# Patient Record
Sex: MALE | Race: WHITE | NOT HISPANIC OR LATINO | Employment: PART TIME | URBAN - METROPOLITAN AREA
[De-identification: names, ages, dates, MRNs, and addresses within clinical notes are randomized per-mention and may not be internally consistent; named-entity substitution may affect disease eponyms.]

---

## 2021-09-16 ENCOUNTER — HOSPITAL ENCOUNTER (EMERGENCY)
Facility: HOSPITAL | Age: 21
Discharge: HOME | End: 2021-09-16
Attending: EMERGENCY MEDICINE | Admitting: EMERGENCY MEDICINE

## 2021-09-16 ENCOUNTER — APPOINTMENT (OUTPATIENT)
Dept: GENERAL RADIOLOGY | Facility: HOSPITAL | Age: 21
End: 2021-09-16

## 2021-09-16 VITALS
RESPIRATION RATE: 18 BRPM | BODY MASS INDEX: 23.7 KG/M2 | HEART RATE: 66 BPM | SYSTOLIC BLOOD PRESSURE: 140 MMHG | DIASTOLIC BLOOD PRESSURE: 85 MMHG | OXYGEN SATURATION: 97 % | WEIGHT: 175 LBS | TEMPERATURE: 97.9 F | HEIGHT: 72 IN

## 2021-09-16 DIAGNOSIS — R55 VASOVAGAL SYNCOPE: ICD-10-CM

## 2021-09-16 DIAGNOSIS — S90.211A CONTUSION OF RIGHT GREAT TOE WITH DAMAGE TO NAIL, INITIAL ENCOUNTER: Primary | ICD-10-CM

## 2021-09-16 LAB
HOLD SPECIMEN: NORMAL
HOLD SPECIMEN: NORMAL
WHOLE BLOOD HOLD SPECIMEN: NORMAL
WHOLE BLOOD HOLD SPECIMEN: NORMAL

## 2021-09-16 PROCEDURE — 99283 EMERGENCY DEPT VISIT LOW MDM: CPT

## 2021-09-16 PROCEDURE — 73630 X-RAY EXAM OF FOOT: CPT

## 2021-09-16 PROCEDURE — 93005 ELECTROCARDIOGRAM TRACING: CPT | Performed by: EMERGENCY MEDICINE

## 2021-09-16 RX ORDER — SODIUM CHLORIDE 0.9 % (FLUSH) 0.9 %
10 SYRINGE (ML) INJECTION AS NEEDED
Status: DISCONTINUED | OUTPATIENT
Start: 2021-09-16 | End: 2021-09-17 | Stop reason: HOSPADM

## 2021-09-17 LAB — HOLD SPECIMEN: NORMAL

## 2021-09-17 NOTE — ED PROVIDER NOTES
Subjective   21-year-old male who presents for evaluation of right great toe pain and syncope.  The patient reports that he dropped a full jar of pickles on his right great toe.  He reports significant pain in association with this.  While he was talking with his friends he noticed that he slowly could not hear what they said, and he ultimately lost consciousness.  The patient states that he has a known history of syncope whenever he cut his finger before the past.  His friends report that he had some mild shaking when he lost consciousness.  He has no previous history of seizures.  He has awoke to his normal baseline mentation without any intervention.  He answers all questions normally at this given time.  No reported chest pain cough or shortness of breath.  No reported fever, body aches, or chills.  No report abdominal pain or change in bowel or urinary function.  His oral fluid intake has been good.  No new or different medications.          Review of Systems   Constitutional: Negative for chills, fatigue and fever.   HENT: Negative for congestion, ear pain, postnasal drip, sinus pressure and sore throat.    Eyes: Negative for pain, redness and visual disturbance.   Respiratory: Negative for cough, chest tightness and shortness of breath.    Cardiovascular: Negative for chest pain, palpitations and leg swelling.   Gastrointestinal: Negative for abdominal pain, anal bleeding, blood in stool, diarrhea, nausea and vomiting.   Endocrine: Negative for polydipsia and polyuria.   Genitourinary: Negative for difficulty urinating, dysuria, frequency and urgency.   Musculoskeletal: Positive for arthralgias. Negative for back pain and neck pain.   Skin: Negative for pallor and rash.   Allergic/Immunologic: Negative for environmental allergies and immunocompromised state.   Neurological: Positive for syncope. Negative for dizziness, weakness and headaches.   Hematological: Negative for adenopathy.   Psychiatric/Behavioral:  Negative for confusion, self-injury and suicidal ideas. The patient is not nervous/anxious.    All other systems reviewed and are negative.      History reviewed. No pertinent past medical history.    No Known Allergies    Past Surgical History:   Procedure Laterality Date   • EAR TUBES         History reviewed. No pertinent family history.    Social History     Socioeconomic History   • Marital status: Single     Spouse name: Not on file   • Number of children: Not on file   • Years of education: Not on file   • Highest education level: Not on file   Tobacco Use   • Smoking status: Never Smoker   • Smokeless tobacco: Never Used   Vaping Use   • Vaping Use: Never used   Substance and Sexual Activity   • Alcohol use: Yes     Comment: occasional   • Drug use: Not Currently     Types: Marijuana     Comment: quit month use   • Sexual activity: Defer           Objective   Physical Exam  Vitals and nursing note reviewed.   Constitutional:       General: He is not in acute distress.     Appearance: Normal appearance. He is well-developed. He is not toxic-appearing or diaphoretic.   HENT:      Head: Normocephalic and atraumatic.      Right Ear: External ear normal.      Left Ear: External ear normal.      Nose: Nose normal.   Eyes:      General: Lids are normal.      Pupils: Pupils are equal, round, and reactive to light.   Neck:      Trachea: No tracheal deviation.   Cardiovascular:      Rate and Rhythm: Normal rate and regular rhythm.      Pulses: No decreased pulses.      Heart sounds: Normal heart sounds. No murmur heard.   No friction rub. No gallop.    Pulmonary:      Effort: Pulmonary effort is normal. No respiratory distress.      Breath sounds: Normal breath sounds. No decreased breath sounds, wheezing, rhonchi or rales.   Abdominal:      General: Bowel sounds are normal.      Palpations: Abdomen is soft.      Tenderness: There is no abdominal tenderness. There is no guarding or rebound.   Musculoskeletal:          General: No deformity. Normal range of motion.      Cervical back: Normal range of motion and neck supple.      Right foot: Tenderness and bony tenderness present. No laceration.        Legs:    Lymphadenopathy:      Cervical: No cervical adenopathy.   Skin:     General: Skin is warm and dry.      Findings: No rash.   Neurological:      Mental Status: He is alert and oriented to person, place, and time.      Cranial Nerves: No cranial nerve deficit.      Sensory: No sensory deficit.   Psychiatric:         Speech: Speech normal.         Behavior: Behavior normal.         Thought Content: Thought content normal.         Judgment: Judgment normal.         Procedures           ED Course                                           MDM  Number of Diagnoses or Management Options  Contusion of right great toe with damage to nail, initial encounter: new and requires workup  Vasovagal syncope: new and requires workup  Diagnosis management comments: No acute injuries identified on x-ray of the right foot.  Patient has a story consistent with vasovagal syncope.    Vital signs are normal here in the ER.    Discharged home appearing well.       Amount and/or Complexity of Data Reviewed  Tests in the radiology section of CPT®: ordered and reviewed  Review and summarize past medical records: yes  Independent visualization of images, tracings, or specimens: yes        Final diagnoses:   Contusion of right great toe with damage to nail, initial encounter   Vasovagal syncope       ED Disposition  ED Disposition     ED Disposition Condition Comment    Discharge Stable           No follow-up provider specified.       Medication List      No changes were made to your prescriptions during this visit.          Xochilt Nunes MD  09/16/21 [id number]

## 2021-09-18 LAB
QT INTERVAL: 418 MS
QTC INTERVAL: 424 MS

## 2021-11-28 ENCOUNTER — E-VISIT (OUTPATIENT)
Dept: FAMILY MEDICINE CLINIC | Facility: TELEHEALTH | Age: 21
End: 2021-11-28

## 2021-11-28 PROCEDURE — BRIGHTMDVISIT: Performed by: NURSE PRACTITIONER

## 2021-11-29 NOTE — E-VISIT TREATED
Chief Complaint: Anxiety, Depression, Stress   Patient introduction   Patient is 21-year-old male presenting with mood symptoms. Patient is not sure how long they've been having symptoms. Patient is willing to try medication as part of their treatment plan.   Patient-submitted comments explaining reason for visit: I've been feeling depressed for a while now. I've seemed to lose interest in things that I used to really enjoy doing and when I do want to do them, I'm either too tired or lack motivation to start..   Patient did not request an excuse note.   Denies recent unusual stress from their home situation, family, personal relationships, work, finances, COVID-19, or current news and events.   Depression screening   PHQ-9. Response options are: Not at all (0), On several days (1), More than half the days (2), or Nearly every day (3).   Over the past 2 weeks, patient has been bothered:    (3) Nearly every day by having little interest or pleasure in doing things    (3) Nearly every day by depressed mood    (3) Nearly every day by sleep disturbance    (3) Nearly every day by fatigue or lethargy    (2) On more than half the days by change in appetite    (1) On several days by feelings of worthlessness or excessive guilt    (3) Nearly every day by poor concentration    (1) On several days by observable restlessness or slowness in movement    (1) On several days by thoughts of hurting themselves or that they'd be better off dead   Reports that the above problems have made it very difficult to work, function at home, or get along with other people.   Score: 20. Interpretation: 0-4: None to minimal. 5-9: Mild depression. 10-14: Major Depressive Disorder, Mild. 15-19: Major Depressive Disorder, Moderately Severe. 20-27: Major Depressive Disorder, Severe.   Anxiety screening   JELLY-7. Response options are: Not at all (0), On several days (1), More than half the days (2), or Nearly every day (3)   Over the past 2 weeks,  patient has been bothered:    (2) On more than half the days by feeling nervous, anxious, or on edge    (3) Nearly every day by not being able to stop or control worrying    (2) On more than half the days by worrying too much about different things    (3) Nearly every day by having trouble relaxing    (1) On several days by being so restless that it's hard to sit still    (2) On more than half the days by becoming easily annoyed or irritable    (1) On several days by feeling afraid, as if something awful might happen   Reports that the above problems have made it very difficult to work, function at home, or get along with other people.   Score: 14. Interpretation: 0-4: None to minimal. 5-9: Mild anxiety. 10-14: Moderate anxiety. 15-21: Severe anxiety.   Suicide risk screening   Score: Negative screen (based on PHQ-9 responses above).   Action taken based on risk:    Negative screen: Patient completed interview.    Low risk: Patient completed interview. Follow-up per provider discretion.    Moderate risk: Recommended to call the National Suicide Prevention Lifeline, 911, or go to their nearest ER. Patient given option to continue with the interview if those options are not relevant at this time. Follow-up per provider discretion.    High risk: Recommended to go to ER, call 911, or call the [employer] Suicide Prevention Lifeline. Patient given option to continue with the interview if those options are not relevant at this time.   Repetitive thoughts and behaviors screening   DSM-5 Level 1 Cross-Cutting Symptom Measure, Section X. 2 items. Response options are: Not at all (0), Rarely (1), Several days (2), More than half the days (3), or Nearly every day (4)   Over the past 2 weeks, patient has been bothered:    (2) On several days by unpleasant thoughts, urges, or images that repeatedly enter their mind    (1) On 1-2 days by feeling driven to repeat certain behaviors or mental acts   Score: 3. Interpretation: 0-2 (with  0-1 on both items): Negative screen. >= 2 (with >= 2 on either item): Positive screen.   Rosio/hypomania screening   DSM-5 Level 1 Cross-Cutting Symptom Measure, Section III. 2 items. Response options are: Not at all (0), Rarely (1), Several days (2), More than half the days (3), or Nearly every day (4)   Over the past 2 weeks, patient has been bothered:    (2) On several days by sleeping less than usual, but still having a lot of energy    (0) Not at all by starting lots more projects than usual or doing more risky things than usual   Score: 2. Interpretation: 0-2 (with <= 1 on both items): Negative screen. >= 2 (with >= 2 on at least 1 item): Positive screen; in-interview follow-up with [place] Self-Rating Rosio (ASRM) Scale.   [place] Self-Rating Rosio (ASRM) Scale. 5 items in which patient chooses the statement that best describes how they've been feeling over the past week.   Patient responses:    (0) I don't feel happier or more cheerful than usual    (0) I don't feel more self-confident than usual    (3) I need less sleep than usual most of the time    (0) I don't talk more than usual    (0) I have not been more active than usual   Score: 3. Interpretation: A score of >= 6 indicates a high probability of a manic or hypomanic condition and may indicate a need for treatment and/or further diagnostic workup. A score of 5 or lower is less likely to be associated with significant symptoms of rosio.   Psychosis/hallucination screening   DSM-5 Level 1 Cross-Cutting Symptom Measure, Section VII. 2 items. Response options are: Not at all (0), Rarely (1), Several days (2), More than half the days (3), or Nearly every day (4)   Over the past 2 weeks, patient has been bothered:    (0) Not at all by hearing things other people couldn't hear    (0) Not at all by feeling that someone could hear their thoughts   Score: 0. Interpretation: 0: Negative screen. 1 or higher: Positive screen.   Substance abuse screening   DSM-5  Level 1 Cross-Cutting Symptom Measure, Section XIII. 3 items on use of alcohol, tobacco, recreational drugs, or prescription medications beyond the amount prescribed or duration of prescription.   Over the past 2 weeks, patient:    (0) Denies having at least 4 alcoholic drinks on any single day    (0) Denies using tobacco    (4) Reports using a recreational or prescription drug on their own nearly every day   Score: 4. Interpretation: 0 is a negative screen. 1 or higher with positive response for prescription/recreational drug abuse leads to follow-up with Level 2 Cross-Cutting Symptom Measure, Section XIII. 1 or higher with positive response for alcohol leads to follow-up with AUDIT-C. 1 or higher with positive response for tobacco use leads to tobacco cessation advice in AVS.   DSM-5 Level 2 Cross-Cutting Symptom Measure, Section XIII. 1 item per positive response to substance use on Level 1 screening above.   Over the past 2 weeks, patient:    (4) Used marijuana nearly every day   Score: 4. Interpretation: Scores on the individual items should be interpreted independently. Positive responses on multiple items indicates greater severity and complexity of substance use.   Comorbid/Exacerbating conditions   Denies history of asthma, cancer, chronic pain, congestive heart failure, coronary artery disease, diabetes, epilepsy, hypertension, inflammatory arthritis, kidney disease or history of kindey function problems, lupus, multiple sclerosis, Parkinson disease, thyroid disorder, and viral hepatitis.   Past mental health history   Denies history of depression, generalized anxiety disorder, panic attacks, PTSD, OCD, bipolar disorder, or schizophrenia or schizoaffective disorder.   Family history of mental health disorders   Reports family history of depression, generalized anxiety disorder, and panic attacks.   Current mental health treatment   Patient is not currently taking medication for any mental health condition.  Patient is not currently in counseling or therapy. Patient is not currently being seen by a psychiatrist and has not been seen by one in the last 2 years.   Previous mental health treatment   Patient denies history of diagnosis with any mental health condition.   Patient has not taken medication for any mental health condition in the past.   Current medications   Denies taking other medications or supplements.   Medication allergies   None.   Medication contraindications   None.   Assessment   Major depressive disorder, single, severe.   This diagnosis is based on review of patient interview responses and other available clinical information.    PHQ-9 depression screening score: 20. Interpretation: 20-27: Major Depressive Disorder, Severe.    JELLY-7 generalized anxiety screening score: 14. Interpretation: 10-14: Moderate anxiety.   Suicide risk severity screening was negative.   Based on screening tests, the following areas warrant further evaluation at follow-up:    Substance use    Repetitive thoughts and behaviors   Plan   Medications:   No medications prescribed.   Orders:    Referral to behavioral health.   Education:    Condition and causes    Treatment and self-care    Possible medication side effects    When to call provider      ----------   Electronically signed by MORALES Levy on 2021-11-28 at 22:00PM   ----------   Patient Interview Transcript:   Have you recently experienced unusual stress from any of these? Select all that apply.    None of the above   Not selected:    Personal relationships    Home situation    Family    Work    Finances    Something related to COVID-19    Current news and events   Have you ever been diagnosed with any of these mental health conditions? Select all that apply.    None of the above   Not selected:    Depression    Generalized anxiety disorder (JELLY)    Panic attacks    Post traumatic stress disorder (PTSD)    Obsessive-compulsive disorder (OCD)    Bipolar  disorder    Schizophrenia or schizoaffective disorder    A mental health condition not listed here (specify)   Are you currently taking medication for any mental health condition? Select one.    No   Not selected:    Yes   Have you taken medication for any mental health condition in the past? Select one.    No   Not selected:    Yes   Are you currently in counseling or therapy? Select one.    No   Not selected:    Yes   Are you currently being seen by a psychiatrist, or have you been seen by a psychiatrist in the last 2 years? Select one.    No   Not selected:    Yes, currently    Yes, within the last 2 years   Do you have any of these medical conditions? Scroll to see all options. Select all that apply.    None of the above   Not selected:    Asthma    Cancer    Chronic pain    Congestive heart failure    Coronary artery disease (blocked arteries in the heart)    Diabetes    Epilepsy    High blood pressure    Inflammatory arthritis    Kidney disease or history of kidney function problems    Lupus (SLE)    Multiple sclerosis    Parkinson disease    Thyroid disorder    Viral hepatitis   Has anyone in your family had any of these? Select all that apply.    Depression    Generalized anxiety disorder (JELLY)    Panic attacks   Not selected:    Post traumatic stress disorder (PTSD)    Obsessive-compulsive disorder (OCD)    Bipolar disorder    Schizophrenia or schizoaffective disorder    Drug or alcohol addiction (substance use disorder)     by suicide    Attempted suicide    No, not that I know of   1. Over the past 2 weeks, how often have you been bothered by: Having little interest or pleasure in doing things Select one.    Nearly every day   Not selected:    Not at all    Several days    More than half the days   2. Over the past 2 weeks, how often have you been bothered by: Feeling down, depressed, or hopeless Select one.    Nearly every day   Not selected:    Not at all    Several days    More than half the days    3. Over the past 2 weeks, how often have you been bothered by: Trouble falling or staying asleep, or sleeping too much Select one.    Nearly every day   Not selected:    Not at all    Several days    More than half the days   4. Over the past 2 weeks, how often have you been bothered by: Feeling tired or having little energy Select one.    Nearly every day   Not selected:    Not at all    Several days    More than half the days   5. Over the past 2 weeks, how often have you been bothered by: Poor appetite or overeating Select one.    More than half the days   Not selected:    Not at all    Several days    Nearly every day   6. Over the past 2 weeks, how often have you been bothered by: Feeling bad about yourself, that you're a failure, or that you've let yourself or friends and family down Select one.    Several days   Not selected:    Not at all    More than half the days    Nearly every day   7. Over the past 2 weeks, how often have you been bothered by: Trouble concentrating on things like watching TV or reading the news Select one.    Nearly every day   Not selected:    Not at all    Several days    More than half the days   8. Over the past 2 weeks, how often have you been bothered by: Moving or speaking so slowly that other people could have noticed OR Being so fidgety or restless that you have been moving around a lot more than usual Select one.    Several days   Not selected:    Not at all    More than half the days    Nearly every day   9. Over the past 2 weeks, how often have you been bothered by: Thoughts that you'd be better off dead or thoughts of hurting yourself Select one.    Several days   Not selected:    Not at all    More than half the days    Nearly every day   How difficult have these problems made it for you to work, take care of things at home, or get along with other people? Select one.    Very difficult   Not selected:    Not difficult at all    Somewhat difficult    Extremely difficult    1. Over the past 2 weeks, how often have you been bothered by: Feeling nervous, anxious, or on edge? Select one.    More than half the days   Not selected:    Not at all    Several days    Nearly every day   2. Over the past 2 weeks, how often have you been bothered by: Not being able to stop or control worrying? Select one.    Nearly every day   Not selected:    Not at all    Several days    More than half the days   3. Over the past 2 weeks, how often have you been bothered by: Worrying too much about different things? Select one.    More than half the days   Not selected:    Not at all    Several days    Nearly every day   4. Over the past 2 weeks, how often have you been bothered by: Having trouble relaxing? Select one.    Nearly every day   Not selected:    Not at all    Several days    More than half the days   5. Over the past 2 weeks, how often have you been bothered by: Being so restless that it's hard to sit still? Select one.    Several days   Not selected:    Not at all    More than half the days    Nearly every day   6. Over the past 2 weeks, how often have you been bothered by: Becoming easily annoyed or irritable? Select one.    More than half the days   Not selected:    Not at all    Several days    Nearly every day   7. Over the past 2 weeks, how often have you been bothered by: Feeling afraid, as if something awful might happen? Select one.    Several days   Not selected:    Not at all    More than half the days    Nearly every day   How difficult have these symptoms made it for you to do your work, take care of things at home, or get along with other people? Select one.    Very difficult   Not selected:    Not difficult at all    Somewhat difficult    Extremely difficult   Over the past 2 weeks, how often have you been bothered by: Sleeping less than usual, but still having a lot of energy? Select one.    Several days   Not selected:    Not at all    1 to 2 days    More than half the days    " Nearly every day   Over the past 2 weeks, how often have you been bothered by: Starting lots more projects than usual or doing more risky things than usual? Select one.    Not at all   Not selected:    1 to 2 days    Several days    More than half the days    Nearly every day   Which statement best describes how you've been feeling over the past week? \"Occasionally\" here means once or twice, and \"often\" means several times or more. Select one.    I don't feel happier or more cheerful than usual   Not selected:    I occasionally feel happier or more cheerful than usual    I often feel happier or more cheerful than usual    I feel happier or more cheerful than usual most of the time    I feel happier or more cheerful than usual all of the time   Which statement best describes how you've been feeling over the past week? \"Occasionally\" here means once or twice, and \"often\" means several times or more. Select one.    I don't feel more self-confident than usual   Not selected:    I occasionally feel more self-confident than usual    I often feel more self-confident than usual    I feel more self-confident than usual most of the time    I feel extremely self-confident all of the time   Which statement best describes how you've been feeling over the past week? \"Occasionally\" here means once or twice, and \"often\" means several times or more. Select one.    I need less sleep than usual most of the time   Not selected:    I don't need less sleep than usual    I occasionally need less sleep than usual    I often need less sleep than usual    I can go all day and all night without any sleep and still not feel tired   Which statement best describes how you've been feeling over the past week? \"Occasionally\" here means once or twice, and \"often\" means several times or more. Select one.    I don't talk more than usual   Not selected:    I occasionally talk more than usual    I often talk more than usual    I talk more than usual " "most of the time    I talk constantly and can't be interrupted   Which statement best describes how you've been feeling over the past week? \"Occasionally\" here means once or twice, and \"often\" means several times or more. By \"active,\" we mean socially, sexually, or at work, home, or school. Select one.    I haven't been more active than usual   Not selected:    I have occasionally been more active than usual    I have often been more active than usual    I have been more active than usual most of the time    I am constantly active or on the go all the time   Over the past 2 weeks, how often have you been bothered by: Hearing things other people couldn't hear, such as voices even when no one was around? Select one.    Not at all   Not selected:    1 to 2 days    Several days    More than half the days    Nearly every day   Over the past 2 weeks, how often have you been bothered by: Feeling that someone could hear your thoughts, or that you could hear what another person was thinking? Select one.    Not at all   Not selected:    1 to 2 days    Several days    More than half the days    Nearly every day   Over the past 2 weeks, how often have you been bothered by: Unpleasant thoughts, urges, or images that repeatedly enter your mind? Select one.    Several days   Not selected:    Not at all    1 to 2 days    More than half the days    Nearly every day   Over the past 2 weeks, how often have you been bothered by: Feeling driven to perform certain behaviors or mental acts over and over again? Select one.    1 to 2 days   Not selected:    Not at all    Several days    More than half the days    Nearly every day   Over the past 2 weeks, how often did you: Have at least 4 drinks of any kind of alcohol in a single day? Select one.    Not at all   Not selected:    1 to 2 days    Several days    More than half the days    Nearly every day   Over the past 2 weeks, how often have you: Smoked any cigarettes, smoked a cigar or " "pipe, or used snuff or chewing tobacco? Select one.    Not at all   Not selected:    1 to 2 days    Several days    More than half the days    Nearly every day   Over the past 2 weeks, how often did you use any of these medicines on your own? \"On your own\" means without a doctor's prescription, or more than prescribed, or longer than prescribed. - Prescription painkillers, such as Vicodin - Stimulants, such as Ritalin or Adderall - Sedatives or tranquilizers, such as sleeping pills or Valium - Marijuana - Cocaine or crack - Club drugs, such as Ecstasy - Hallucinogens, such as LSD - Heroin - Inhalants or solvents, such as glue - Methamphetamines, such as speed Select one.    Nearly every day   Not selected:    Not at all    1 to 2 days    Several days    More than half the days   Over the past 2 weeks, which of these medicines did you use on your own? Select all that apply.    Marijuana   Not selected:    Prescription painkillers, such as Vicodin    Stimulants, such as Ritalin or Adderall    Sedatives or tranquilizers, such as sleeping pills or Valium    Cocaine or crack    Club drugs, such as Ecstasy    Hallucinogens, such as LSD    Heroin    Inhalants or solvents, such as glue    Methamphetamines, such as speed   Over the past 2 weeks, how often did you use marijuana? Select one.    Nearly every day   Not selected:    1 to 2 days    Several days    More than half the days   Think about all of the symptoms you've shared with us today. How long have you been feeling this way? Select one.    I'm not sure   Not selected:    More than a year    Less than a year   These last few questions help us make sure your treatment plan is safe for you. Do you have any of these conditions? Select all that apply.    None of these   Not selected:    Uncorrected or persistent electrolyte abnormalities, such as potassium, sodium, calcium or magnesium    QT prolongation    Congenital long QT syndrome (LQTS)    Ventricular arrhythmias, " "such as ventricular fibrillation or ventricular tachycardia    Bradycardia (low heart rate)    Recent heart attack    Congestive heart failure (CHF)    Brugada syndrome   Do any of these apply to you now or in the recent past? \"Cold turkey\" here means stopping a medication suddenly rather than slowly taking lower and lower doses until you're off the medication. Select all that apply.    None of these   Not selected:    Seizure disorder    Bulimia or anorexia    Liver disease    Alcohol abuse    Stopped using alcohol \"cold turkey\"    Stopped using a sedative \"cold turkey\"    Stopped using an anti-seizure drug \"cold turkey\"    Stopped using a benzodiazepine drug (Klonopin, Valium, Ativan, Xanax) \"cold turkey\"   Are you currently taking any of these medications? Select all that apply.    None of these   Not selected:    MAO inhibitor in the last 14 days    Linezolid or IV methylene blue    Pimozide    Thioridazine   Are you taking any other medications or supplements? On the next screen, you need to list all vitamins, supplements, non-prescription medications (such as aspirin or Aleve), and prescription medications that you're taking. Select one.    No   Not selected:    Yes    Yes, but I'm not sure what they are   Have you ever had an allergic or bad reaction to any medication? Select one.    No   Not selected:    Yes   If medication is recommended as part of your treatment plan, is that something you're willing to try? Select one.    Yes   Not selected:    No   Do you need a doctor's note? A doctor's note confirms that you received care today and states when you can return to school or work. It does not contain information about your diagnosis or treatment plan. Your provider will make the final decision on whether to give you a doctor's note. Doctor's notes CANNOT be backdated. Select one.    No   Not selected:    Today only (1 day)    Today and tomorrow (2 days)    3 days   What is the main reason you're taking " this interview today?    I've been feeling depressed for a while now. I've seemed to lose interest in things that I used to really enjoy doing and when I do want to do them, I'm either too tired or lack motivation to start.   ----------   Medical history   Medical history data does not currently exist for this patient.

## 2021-11-29 NOTE — EXTERNAL PATIENT INSTRUCTIONS
Diagnosis   Depression   My name is Meme Porter. I'm a healthcare provider at [facility]. I've reviewed your interview, and I see that you have some common symptoms of depression. I'm glad you reached out.   Depression is the most common mental health condition worldwide. In the [place], about 1 in 5 people will experience clinical depression in their lifetime. Fortunately, effective treatments are available. The sooner you start treatment, the better it works.   Treatment for depression can include counseling, coaching, consultations, antidepressant medications, or various digital tools. In creating your treatment plan, I've considered your symptoms, current situation, medical history, and previous treatments, if any.    Please follow up with your provider in a few weeks. They'll check how you're doing and adjust your treatment plan if necessary.   In addition to your prescribed treatment, there are things you can do to help yourself feel better. Depression can lead you to avoid doing tasks, activities, and being with others. Taking action can help break the cycle of avoidance. Even small actions and lifestyle changes can make a big difference. Try some of the suggestions in the Other treatment section below.   Orders and referrals   I've included a referral for therapy in your treatment plan. Someone will contact you to schedule an appointment for counseling or therapy.   About your diagnosis   Depression is different from ordinary sadness. When you're sad or going through normal grief, the feelings may come and go, and then fade over time. Depression causes long-standing symptoms that affect your ability to go about your daily life.   It's a health condition that affects how you think and function, and can even affect your self-esteem. Sometimes depression can also cause physical symptoms such as headaches and stomach pains.   Common symptoms of depression include:    Feeling sad, hopeless,  discouraged, or down    Loss of interest or pleasure in previously enjoyable activities    Appetite or weight changes    Sleep disturbances: sleeping too much or too little    Either restlessness or sluggishness    Loss of energy    Excessive guilt    Feelings of worthlessness    Difficulty concentrating    Recurrent thoughts of death or suicide   What to expect   Counseling and talk therapy   Counseling or therapy teaches you new coping skills and more adaptive ways of thinking about problems. These tools can help you make positive changes. The benefits of counseling often last long after treatment sessions have stopped.   Many people with mild symptoms of depression don't need medication, and can be treated with counseling, coaching, or other types of care management.   When to seek care   If you feel like harming yourself or others, call 911 right away.   The National Suicide Prevention Lifeline is also available. You can call it at 1-799.256.8126  . You can also access their online chat line  .   Call us at 1 [phone]) [phone]   with any sudden or unexpected symptoms.    Worsening depression symptoms    New or worsening anxiety symptoms    Feeling extremely agitated or restless    Panic attacks    Worsening insomnia    New or worsening irritability    Inappropriate aggression, anger, or violence    Dangerous impulses    Extreme increase in activity or talking    Other unusual changes in behavior, mood, thoughts, or feeling   Other treatment   The tips below may help you feel better while you start your treatment plan:   Self-care    Depression can make self-care hard, but taking action can help you get better. So start where you are and set small goals. These can be simple: get out of bed, take a shower, get dressed, prepare a meal.    Make a list of activities that usually improve your mood. When you're feeling down, try doing one of those activities, even for a few minutes.    Be kind to yourself. Don't get down  "on yourself if you don't reach a goal. Be willing to try again.    Try to eat on a regular schedule. Blood sugar levels can affect mood.   Exercise    Physical exercise has an especially positive effect on mood. If you're able to, try walking 30 minutes a day, 3 to 5 times a week. If that sounds like too much, challenge yourself to start walking for just 10 minutes a day. If walking is not for you, find another activity. Any kind of physical activity helps. The best exercise is the kind you enjoy and will actually do.   Improve your sleep   Getting better sleep is one of the best things you can do to improve your symptoms.    Caffeine, tobacco, and alcohol can cause interrupted sleep. Cutting down or quitting these can improve the quality of your sleep. If you can't quit caffeine completely, try avoiding it later in the day.    Set a regular bedtime, and allow a period of time to \"unwind\" before going to sleep.    Wake up at the same time every day.    Turn off or put away all electronic devices an hour before going to sleep.    Avoid reading, watching TV, or using electronic devices in bed.    As much as possible, keep your bedroom dark, cool, and quiet.    If you're struggling to sleep, don't stay in bed. Get up and go to a quiet spot. Read or do relaxation exercises. Then go back to bed and try again.   Try mindfulness exercises    If your mind races, focus on your body instead. Breathe in slowly through your nose and out through your mouth.    Some people find that meditation helps with mood symptoms. If you want to try meditation but don't know how, mobile apps can get you started.   Use your creativity    When you have depression, you can often spend too much time thinking. Making something with your hands can use your thoughts in a positive way and bring some relief. It also helps you move from inaction to action. Activities like writing in a journal, gardening, woodworking, cooking, or doing a craft can help " focus your mind.   Connect with others    If you can't meet in person, send a short text or email to someone just to keep in touch.    If you use social media, notice how it makes you feel. If certain topics or people have a negative effect on your mood, unfollow them. Limit the time you spend on social media. Active participation can be better than passive scrolling through a feed.    If you're up to it, try volunteering. Or just do something kind for someone. This can lift your mood as well as theirs.   Your provider   Your diagnosis was provided by Meme Porter, a member of your trusted care team at [facility].   If you have any questions, call us at [phone]  .

## 2021-12-10 ENCOUNTER — HOSPITAL ENCOUNTER (EMERGENCY)
Facility: HOSPITAL | Age: 21
Discharge: HOME | End: 2021-12-10
Attending: EMERGENCY MEDICINE | Admitting: EMERGENCY MEDICINE

## 2021-12-10 VITALS
OXYGEN SATURATION: 100 % | TEMPERATURE: 97.2 F | HEART RATE: 74 BPM | BODY MASS INDEX: 23.7 KG/M2 | HEIGHT: 72 IN | WEIGHT: 175 LBS | SYSTOLIC BLOOD PRESSURE: 112 MMHG | DIASTOLIC BLOOD PRESSURE: 57 MMHG | RESPIRATION RATE: 15 BRPM

## 2021-12-10 DIAGNOSIS — U07.1 COVID-19: Primary | ICD-10-CM

## 2021-12-10 DIAGNOSIS — R55 NEAR SYNCOPE: ICD-10-CM

## 2021-12-10 DIAGNOSIS — E86.9 VOLUME DEPLETION: ICD-10-CM

## 2021-12-10 LAB
ALBUMIN SERPL-MCNC: 4.4 G/DL (ref 3.5–5.2)
ALBUMIN/GLOB SERPL: 1.3 G/DL
ALP SERPL-CCNC: 104 U/L (ref 39–117)
ALT SERPL W P-5'-P-CCNC: 16 U/L (ref 1–41)
ANION GAP SERPL CALCULATED.3IONS-SCNC: 13 MMOL/L (ref 5–15)
AST SERPL-CCNC: 33 U/L (ref 1–40)
BASOPHILS # BLD AUTO: 0 10*3/MM3 (ref 0–0.2)
BASOPHILS NFR BLD AUTO: 0 % (ref 0–1.5)
BILIRUB SERPL-MCNC: 0.4 MG/DL (ref 0–1.2)
BUN SERPL-MCNC: 10 MG/DL (ref 6–20)
BUN/CREAT SERPL: 9.8 (ref 7–25)
CALCIUM SPEC-SCNC: 8.7 MG/DL (ref 8.6–10.5)
CHLORIDE SERPL-SCNC: 100 MMOL/L (ref 98–107)
CO2 SERPL-SCNC: 27 MMOL/L (ref 22–29)
CREAT SERPL-MCNC: 1.02 MG/DL (ref 0.76–1.27)
D DIMER PPP FEU-MCNC: <0.27 MCGFEU/ML (ref 0–0.56)
DEPRECATED RDW RBC AUTO: 39.6 FL (ref 37–54)
EOSINOPHIL # BLD AUTO: 0.01 10*3/MM3 (ref 0–0.4)
EOSINOPHIL NFR BLD AUTO: 0.2 % (ref 0.3–6.2)
ERYTHROCYTE [DISTWIDTH] IN BLOOD BY AUTOMATED COUNT: 11.8 % (ref 12.3–15.4)
FLUAV SUBTYP SPEC NAA+PROBE: NOT DETECTED
FLUBV RNA ISLT QL NAA+PROBE: NOT DETECTED
GFR SERPL CREATININE-BSD FRML MDRD: 93 ML/MIN/1.73
GLOBULIN UR ELPH-MCNC: 3.4 GM/DL
GLUCOSE SERPL-MCNC: 117 MG/DL (ref 65–99)
HCT VFR BLD AUTO: 46.9 % (ref 37.5–51)
HGB BLD-MCNC: 15.7 G/DL (ref 13–17.7)
HOLD SPECIMEN: NORMAL
IMM GRANULOCYTES # BLD AUTO: 0.02 10*3/MM3 (ref 0–0.05)
IMM GRANULOCYTES NFR BLD AUTO: 0.3 % (ref 0–0.5)
LYMPHOCYTES # BLD AUTO: 1.44 10*3/MM3 (ref 0.7–3.1)
LYMPHOCYTES NFR BLD AUTO: 22.9 % (ref 19.6–45.3)
MAGNESIUM SERPL-MCNC: 2.3 MG/DL (ref 1.7–2.2)
MCH RBC QN AUTO: 30.7 PG (ref 26.6–33)
MCHC RBC AUTO-ENTMCNC: 33.5 G/DL (ref 31.5–35.7)
MCV RBC AUTO: 91.6 FL (ref 79–97)
MONOCYTES # BLD AUTO: 0.45 10*3/MM3 (ref 0.1–0.9)
MONOCYTES NFR BLD AUTO: 7.1 % (ref 5–12)
NEUTROPHILS NFR BLD AUTO: 4.38 10*3/MM3 (ref 1.7–7)
NEUTROPHILS NFR BLD AUTO: 69.5 % (ref 42.7–76)
NRBC BLD AUTO-RTO: 0 /100 WBC (ref 0–0.2)
PLATELET # BLD AUTO: 154 10*3/MM3 (ref 140–450)
PMV BLD AUTO: 11 FL (ref 6–12)
POTASSIUM SERPL-SCNC: 4 MMOL/L (ref 3.5–5.2)
PROT SERPL-MCNC: 7.8 G/DL (ref 6–8.5)
RBC # BLD AUTO: 5.12 10*6/MM3 (ref 4.14–5.8)
SARS-COV-2 RNA PNL SPEC NAA+PROBE: DETECTED
SODIUM SERPL-SCNC: 140 MMOL/L (ref 136–145)
TROPONIN T SERPL-MCNC: <0.01 NG/ML (ref 0–0.03)
WBC NRBC COR # BLD: 6.3 10*3/MM3 (ref 3.4–10.8)
WHOLE BLOOD HOLD SPECIMEN: NORMAL
WHOLE BLOOD HOLD SPECIMEN: NORMAL

## 2021-12-10 PROCEDURE — 85025 COMPLETE CBC W/AUTO DIFF WBC: CPT | Performed by: EMERGENCY MEDICINE

## 2021-12-10 PROCEDURE — 93005 ELECTROCARDIOGRAM TRACING: CPT | Performed by: EMERGENCY MEDICINE

## 2021-12-10 PROCEDURE — 96374 THER/PROPH/DIAG INJ IV PUSH: CPT

## 2021-12-10 PROCEDURE — 87636 SARSCOV2 & INF A&B AMP PRB: CPT | Performed by: EMERGENCY MEDICINE

## 2021-12-10 PROCEDURE — 99283 EMERGENCY DEPT VISIT LOW MDM: CPT

## 2021-12-10 PROCEDURE — 84484 ASSAY OF TROPONIN QUANT: CPT | Performed by: EMERGENCY MEDICINE

## 2021-12-10 PROCEDURE — 25010000002 KETOROLAC TROMETHAMINE PER 15 MG: Performed by: PHYSICIAN ASSISTANT

## 2021-12-10 PROCEDURE — 83735 ASSAY OF MAGNESIUM: CPT | Performed by: EMERGENCY MEDICINE

## 2021-12-10 PROCEDURE — 36415 COLL VENOUS BLD VENIPUNCTURE: CPT

## 2021-12-10 PROCEDURE — 80053 COMPREHEN METABOLIC PANEL: CPT | Performed by: EMERGENCY MEDICINE

## 2021-12-10 PROCEDURE — 85379 FIBRIN DEGRADATION QUANT: CPT | Performed by: EMERGENCY MEDICINE

## 2021-12-10 RX ORDER — KETOROLAC TROMETHAMINE 30 MG/ML
15 INJECTION, SOLUTION INTRAMUSCULAR; INTRAVENOUS ONCE
Status: COMPLETED | OUTPATIENT
Start: 2021-12-10 | End: 2021-12-10

## 2021-12-10 RX ORDER — SODIUM CHLORIDE 0.9 % (FLUSH) 0.9 %
10 SYRINGE (ML) INJECTION AS NEEDED
Status: DISCONTINUED | OUTPATIENT
Start: 2021-12-10 | End: 2021-12-10 | Stop reason: HOSPADM

## 2021-12-10 RX ADMIN — SODIUM CHLORIDE 1000 ML: 9 INJECTION, SOLUTION INTRAVENOUS at 19:01

## 2021-12-10 RX ADMIN — KETOROLAC TROMETHAMINE 15 MG: 30 INJECTION, SOLUTION INTRAMUSCULAR; INTRAVENOUS at 21:08

## 2021-12-11 LAB
QT INTERVAL: 368 MS
QTC INTERVAL: 419 MS

## 2021-12-11 NOTE — DISCHARGE INSTRUCTIONS
Quarantine for 6 more days.  Push fluids.  Return here for any problems or complications.  Tylenol Motrin for fevers pain body aches.

## 2021-12-13 ENCOUNTER — READMISSION MANAGEMENT (OUTPATIENT)
Dept: CALL CENTER | Facility: HOSPITAL | Age: 21
End: 2021-12-13

## 2021-12-13 NOTE — OUTREACH NOTE
COVID-19 Week 1 Survey      Responses   [facility] patient discharged from? [facility]   Does the patient have one of the following disease processes/diagnoses(primary or secondary)? COVID-19   COVID-19 underlying condition? None   Call Number Call 1   Week 1 Call successful? Yes   Call start time 1313   Call end time 1319   Discharge diagnosis Covid +   Person spoke with today (if not patient) and relationship Patient   Meds reviewed with patient/caregiver? Yes   Is the patient having any side effects they believe may be caused by any medication additions or changes? No   Does the patient have all medications ordered at discharge? Yes   Is the patient taking all medications as directed (includes completed medication regime)? Yes   Does the patient have a primary care provider?  No   PCP Nursing Intervention Provided number to obtain PCP   Does the patient have an appointment with their PCP or specialist within 7 days of discharge? No   What is preventing the patient from scheduling follow up appointments within 7 days of discharge? Unsure of when or with whom   Nursing Interventions Educated patient on importance of making appointment,  Advised patient to make appointment   Has the patient kept scheduled appointments due by today? N/A   What DME was ordered? Oximeter given   Has all DME been delivered? Yes   Psychosocial issues? No   Did the patient receive a copy of their discharge instructions? Yes   Did the patient receive a copy of COVID-19 specific instructions? Yes   What is the patient's perception of their health status since discharge? Improving   Does the patient have any of the following symptoms? Cough   Nursing Interventions Nurse provided patient education   Pulse Ox monitoring Intermittent   Pulse Ox device source Patient,  Hospital   O2 Sat comments 98% RA   O2 Sat: education provided Sat levels,  Monitoring frequency,  When to seek care   O2 Sat education comments sats remaining below 94% call  911/go to ED   Is the patient/caregiver able to teach back steps to recovery at home? Rest and rebuild strength, gradually increase activity,  Eat a well-balance diet   If the patient is a current smoker, are they able to teach back resources for cessation? Not a smoker   Is the patient/caregiver able to teach back the hierarchy of who to call/visit for symptoms/problems? PCP, Specialist, Home health nurse, Urgent Care, ED, 911 Yes   COVID-19 call completed? Yes   Wrap up additional comments Pt states he has a cough, and congestion. Pt was provided number to help obtain a PCP. No questions/concerns.           Diya Jara RN

## 2021-12-13 NOTE — OUTREACH NOTE
Prep Survey      Responses   [facility] patient discharged from? [facility]   Is LACE score < 7 ? No   Emergency Room discharge w/ pulse ox? Yes   Eligibility Readm Mgmt   Discharge diagnosis Covid +   Does the patient have one of the following disease processes/diagnoses(primary or secondary)? COVID-19   Does the patient have Home health ordered? No   Is there a DME ordered? Yes   What DME was ordered? PO given w/ instructions   General alerts for this patient ED d/c call x 3 only please   Prep survey completed? Yes          Charlee Johnson RN

## 2021-12-14 ENCOUNTER — READMISSION MANAGEMENT (OUTPATIENT)
Dept: CALL CENTER | Facility: HOSPITAL | Age: 21
End: 2021-12-14

## 2021-12-14 NOTE — OUTREACH NOTE
COVID-19 Week 1 Survey      Responses   [facility] patient discharged from? [facility]   Does the patient have one of the following disease processes/diagnoses(primary or secondary)? COVID-19   COVID-19 underlying condition? None   Call Number Call 2   Week 1 Call successful? Yes   Call start time 1230   Call end time 1231   Is the patient taking all medications as directed (includes completed medication regime)? Yes   What is the patient's perception of their health status since discharge? Improving   Does the patient have any of the following symptoms? Cough  [not as much coughing, just a little congested ]   Pulse Ox monitoring Intermittent   Pulse Ox device source Patient   O2 Sat comments 98% RA   O2 Sat: education provided Sat levels,  Monitoring frequency,  When to seek care   Is the patient/caregiver able to teach back steps to recovery at home? Set small, achievable goals for return to baseline health,  Rest and rebuild strength, gradually increase activity   COVID-19 call completed? Yes          Lindsey Tinoco RN

## 2021-12-15 ENCOUNTER — READMISSION MANAGEMENT (OUTPATIENT)
Dept: CALL CENTER | Facility: HOSPITAL | Age: 21
End: 2021-12-15

## 2021-12-15 NOTE — OUTREACH NOTE
COVID-19 Week 1 Survey      Responses   [facility] patient discharged from? [facility]   Does the patient have one of the following disease processes/diagnoses(primary or secondary)? COVID-19   COVID-19 underlying condition? None   Call Number Call 3   Week 1 Call successful? Yes   Call start time 1016   Call end time 1019   Discharge diagnosis Covid +   Person spoke with today (if not patient) and relationship Patient   Meds reviewed with patient/caregiver? Yes   Is the patient having any side effects they believe may be caused by any medication additions or changes? No   Does the patient have all medications ordered at discharge? Yes   Is the patient taking all medications as directed (includes completed medication regime)? Yes   Does the patient have a primary care provider?  No   PCP Nursing Intervention Provided number to obtain PCP   Comments regarding PCP PCP number provided and encouraged him to make followup   Does the patient have an appointment with their PCP or specialist within 7 days of discharge? No   What is preventing the patient from scheduling follow up appointments within 7 days of discharge? Unsure of when or with whom   Has the patient kept scheduled appointments due by today? N/A   What DME was ordered? Oximeter given   Has all DME been delivered? Yes   Psychosocial issues? No   Did the patient receive a copy of their discharge instructions? Yes   Did the patient receive a copy of COVID-19 specific instructions? Yes   Nursing interventions Reviewed instructions with patient   What is the patient's perception of their health status since discharge? Improving   Does the patient have any of the following symptoms? Cough   Pulse Ox monitoring Intermittent   O2 Sat comments 98% RA   O2 Sat: education provided Sat levels,  Monitoring frequency,  When to seek care   O2 Sat education comments sats remaining below 94% call 911/go to ED   Is the patient/caregiver able to teach back steps to  recovery at home? Set small, achievable goals for return to baseline health,  Rest and rebuild strength, gradually increase activity   If the patient is a current smoker, are they able to teach back resources for cessation? Not a smoker   Is the patient/caregiver able to teach back the hierarchy of who to call/visit for symptoms/problems? PCP, Specialist, Home health nurse, Urgent Care, ED, 911 Yes   COVID-19 call completed? Yes   Revoked No further contact(revokes)-requires comment   Is the patient interested in additional calls from an ambulatory ?  NOTE:  applies to high risk patients requiring additional follow-up. No   Graduated/Revoked comments ED visit only- call x 3.    Wrap up additional comments Doing well. This is ED visit only          Fly Montes De Oca RN

## 2024-01-23 NOTE — ED PROVIDER NOTES
Subjective   21-year-old male presents emergency department today after he was over the [facility] and had a syncopal episode.  He went over there because he had a 4-day history of cough congestion body aches discharge not feeling well.  He is not had a documented fever above 99.  He reports that day was over the [facility] started feeling really poorly states he was having body aches and fevers and chills then had a syncopal episode or near syncopal episode.  He states that he is not can go pulled completely out but he did abrade the earlobe on the right side.  He has had no headache no nausea or vomiting.  He denies any melena hematochezia hematemesis.  He had syncopal episodes in the past      History provided by:  Patient   used: No    Syncope  Episode history:  Single  Most recent episode:  Today  Timing:  Constant  Progression:  Resolved  Chronicity:  New  Context: dehydration    Witnessed: yes    Relieved by:  Lying down  Worsened by:  Posture  Ineffective treatments:  None tried  Associated symptoms: fever, malaise/fatigue and nausea    Associated symptoms: no anxiety, no chest pain, no diaphoresis, no difficulty breathing, no focal sensory loss, no headaches, no palpitations, no recent fall, no recent injury, no recent surgery, no rectal bleeding, no seizures, no shortness of breath, no visual change, no vomiting and no weakness    Risk factors: no congenital heart disease, no coronary artery disease and no vascular disease        Review of Systems   Constitutional: Positive for fever and malaise/fatigue. Negative for diaphoresis.   Respiratory: Negative for chest tightness, shortness of breath and wheezing.    Cardiovascular: Positive for syncope. Negative for chest pain and palpitations.   Gastrointestinal: Positive for nausea. Negative for vomiting.   Genitourinary: Negative for dysuria, frequency and urgency.   Musculoskeletal: Negative for back pain and neck pain.   Skin: Negative for pallor and  rash.   Neurological: Negative for seizures, weakness and headaches.   Psychiatric/Behavioral: Negative.    All other systems reviewed and are negative.      History reviewed. No pertinent past medical history.    No Known Allergies    Past Surgical History:   Procedure Laterality Date   • EAR TUBES         History reviewed. No pertinent family history.    Social History     Socioeconomic History   • Marital status: Single   Tobacco Use   • Smoking status: Never Smoker   • Smokeless tobacco: Never Used   Vaping Use   • Vaping Use: Never used   Substance and Sexual Activity   • Alcohol use: Yes     Comment: occasional   • Drug use: Not Currently     Types: Marijuana     Comment: quit month use   • Sexual activity: Defer           Objective   Physical Exam  Vitals and nursing note reviewed.   Constitutional:       Appearance: He is well-developed.   HENT:      Head: Normocephalic and atraumatic.      Right Ear: External ear normal.      Left Ear: External ear normal.      Nose: Nose normal.      Mouth/Throat:      Mouth: Mucous membranes are dry.   Eyes:      General: No scleral icterus.     Conjunctiva/sclera: Conjunctivae normal.   Neck:      Thyroid: No thyromegaly.   Cardiovascular:      Rate and Rhythm: Normal rate and regular rhythm.      Heart sounds: Normal heart sounds.   Pulmonary:      Effort: Pulmonary effort is normal. No respiratory distress.      Breath sounds: Normal breath sounds. No wheezing or rales.   Chest:      Chest wall: No tenderness.   Abdominal:      General: Bowel sounds are normal. There is no distension.      Palpations: Abdomen is soft.      Tenderness: There is no abdominal tenderness.   Musculoskeletal:         General: Normal range of motion.      Cervical back: Normal range of motion.   Lymphadenopathy:      Cervical: No cervical adenopathy.   Skin:     General: Skin is warm and dry.   Neurological:      Mental Status: He is alert and oriented to person, place, and time.      Cranial  "Nerves: No cranial nerve deficit.      Coordination: Coordination normal.      Deep Tendon Reflexes: Reflexes are normal and symmetric. Reflexes normal.   Psychiatric:         Behavior: Behavior normal.         Thought Content: Thought content normal.         Judgment: Judgment normal.         Procedures           ED Course  ED Course as of 12/11/21 2142   Fri Dec 10, 2021   2043 We discussed his positive Covid test.  He needs to quarantine for 6 more days.  Centiliter fluid here.  He will be urged to continue fluids given a work note for several days been given an O2 sat monitor instructed to return if O2 sats are 90% or lower consistently or other problems or complications. [TODD]      ED Course User Index  [TODD] Truong Andres PA                                         No results found for this or any previous visit (from the past 24 hour(s)).  Note: In addition to lab results from this visit, the labs listed above may include labs taken at another facility or during a different encounter within the last 24 hours. Please correlate lab times with ED admission and discharge times for further clarification of the services performed during this visit.    No orders to display     Vitals:    12/10/21 1740 12/10/21 1902 12/10/21 2000 12/10/21 2110   BP: 108/62 112/66 113/63 112/57   BP Location: Left arm Left arm Left arm Left arm   Patient Position: Sitting Lying Lying Lying   Pulse: 83 66 69 74   Resp: 16 20  15   Temp: 97.2 °F (36.2 °C)      TempSrc: Oral      SpO2: 93% 98% 98% 100%   Weight: 79.4 kg (175 lb)      Height: 182.9 cm (72\")        Medications   sodium chloride 0.9 % bolus 1,000 mL (0 mL Intravenous Stopped 12/10/21 1931)   ketorolac (TORADOL) injection 15 mg (15 mg Intravenous Given 12/10/21 2108)     ECG/EMG Results (last 24 hours)     Procedure Component Value Units Date/Time    ECG 12 Lead [[id number]] Collected: 12/10/21 1823     Updated: 12/10/21 1825        ECG 12 Lead   Final Result   Test " Reason : Syncope triage protocol   Blood Pressure :   */*   mmHG   Vent. Rate :  78 BPM     Atrial Rate :  78 BPM      P-R Int : 160 ms          QRS Dur :  90 ms       QT Int : 368 ms       P-R-T Axes :  64  78  64 degrees      QTc Int : 419 ms      Normal sinus rhythm   Normal ECG   When compared with ECG of 16-SEP-2021 22:24,   No significant change was found   Confirmed by RAUL ESPINOSA MD ([id number]) on 12/11/2021 1:42:56 AM      Referred By:            Confirmed By: RAUL ESPINOSA MD      ECG 12 Lead    (Results Pending)               MDM  Number of Diagnoses or Management Options  COVID-19: new and requires workup  Near syncope: new and requires workup  Volume depletion: new and requires workup     Amount and/or Complexity of Data Reviewed  Clinical lab tests: reviewed and ordered  Tests in the medicine section of CPT®: ordered and reviewed  Discuss the patient with other providers: yes    Patient Progress  Patient progress: stable      Final diagnoses:   COVID-19   Near syncope   Volume depletion       ED Disposition  ED Disposition     ED Disposition Condition Comment    Discharge Stable           PATIENT [facility] - [place] [address]  [phone]    Call for appointment         Medication List      New Prescriptions    diclofenac 50 MG EC tablet  Commonly known as: VOLTAREN  Take 1 tablet by mouth 3 (Three) Times a Day.           Where to Get Your Medications      [facility] - [phone]  - [phone] [facility] [address]    Phone: [phone]   · diclofenac 50 MG EC tablet          Truong Andres PA  12/11/21 5580     How Severe Is Your Rash?: moderate Is This A New Presentation, Or A Follow-Up?: Rash